# Patient Record
Sex: FEMALE | Race: WHITE | NOT HISPANIC OR LATINO | Employment: FULL TIME | URBAN - METROPOLITAN AREA
[De-identification: names, ages, dates, MRNs, and addresses within clinical notes are randomized per-mention and may not be internally consistent; named-entity substitution may affect disease eponyms.]

---

## 2019-06-18 ENCOUNTER — HOSPITAL ENCOUNTER (EMERGENCY)
Facility: HOSPITAL | Age: 39
Discharge: HOME | End: 2019-06-18
Attending: EMERGENCY MEDICINE
Payer: OTHER GOVERNMENT

## 2019-06-18 VITALS
DIASTOLIC BLOOD PRESSURE: 89 MMHG | BODY MASS INDEX: 34.91 KG/M2 | RESPIRATION RATE: 20 BRPM | HEIGHT: 63 IN | OXYGEN SATURATION: 99 % | HEART RATE: 100 BPM | SYSTOLIC BLOOD PRESSURE: 122 MMHG | WEIGHT: 197 LBS | TEMPERATURE: 98 F
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
## 2019-06-18 DIAGNOSIS — S16.1XXA STRAIN OF NECK MUSCLE, INITIAL ENCOUNTER: ICD-10-CM

## 2019-06-18 PROCEDURE — 99282 EMERGENCY DEPT VISIT SF MDM: CPT

## 2019-06-19 NOTE — DISCHARGE INSTRUCTIONS
Review attached instructions  Ibuprofen 200 mg tablet take 3 tablets 3 times daily with meals  Gentle range of motion  Expect stepwise improvement over the following days
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
## 2019-08-22 ENCOUNTER — OFFICE VISIT (OUTPATIENT)
Dept: GENETICS | Facility: CLINIC | Age: 39
End: 2019-08-22
Payer: OTHER GOVERNMENT

## 2019-08-22 ENCOUNTER — LAB VISIT (OUTPATIENT)
Dept: LAB | Facility: HOSPITAL | Age: 39
End: 2019-08-22
Attending: MEDICAL GENETICS
Payer: OTHER GOVERNMENT

## 2019-08-22 VITALS
HEIGHT: 64 IN | BODY MASS INDEX: 32.31 KG/M2 | SYSTOLIC BLOOD PRESSURE: 139 MMHG | HEART RATE: 101 BPM | WEIGHT: 189.25 LBS | DIASTOLIC BLOOD PRESSURE: 78 MMHG

## 2019-08-22 DIAGNOSIS — Z84.81 FAMILY HISTORY OF BRCA1 GENE POSITIVE: ICD-10-CM

## 2019-08-22 DIAGNOSIS — Z84.81 FAMILY HISTORY OF BRCA1 GENE POSITIVE: Primary | ICD-10-CM

## 2019-08-22 PROCEDURE — 99499 UNLISTED E&M SERVICE: CPT | Mod: S$PBB,,, | Performed by: MEDICAL GENETICS

## 2019-08-22 PROCEDURE — 99212 OFFICE O/P EST SF 10 MIN: CPT | Mod: PBBFAC | Performed by: GENETIC COUNSELOR, MS

## 2019-08-22 PROCEDURE — 96040 PR GENETIC COUNSELING, EACH 30 MIN: CPT | Mod: ,,, | Performed by: MEDICAL GENETICS

## 2019-08-22 PROCEDURE — 96040 PR GENETIC COUNSELING, EACH 30 MIN: ICD-10-PCS | Mod: ,,, | Performed by: MEDICAL GENETICS

## 2019-08-22 PROCEDURE — 99999 PR PBB SHADOW E&M-EST. PATIENT-LVL II: ICD-10-PCS | Mod: PBBFAC,,,

## 2019-08-22 PROCEDURE — 99999 PR PBB SHADOW E&M-EST. PATIENT-LVL II: CPT | Mod: PBBFAC,,,

## 2019-08-22 PROCEDURE — 36415 COLL VENOUS BLD VENIPUNCTURE: CPT

## 2019-08-22 PROCEDURE — 99499 NO LOS: ICD-10-PCS | Mod: S$PBB,,, | Performed by: MEDICAL GENETICS

## 2019-08-22 NOTE — PROGRESS NOTES
Aurelia Villeda   DOS: 2019  : 1980  MRN: [id number]    REFERRING MD: Self    REASON FOR CONSULT: Our Medical Genetic Service was asked to evaluate this 38-year-old woman with a family history of cancer, and a family history of BRCA1. She presents with her .    PRESENT ILLNESS:  Ms. Villeda is a  female with no personal history of cancer. She had a breast biopsy last week with results pending. She had menarche at age 10 and has taken hormonal contraceptives for 15 years. She has never had hormone replacement therapy (HRT). She has never had a colonoscopy or dermatology examination. Her uterus and ovaries remain intact.     PAST MEDICAL HISTORY  Family history BRCA1 Positive Mutation  Family history ovarian cancer  Family history of melanoma     FAMILY HISTORY: Ms. Villeda has a significant family history of cancer on both sides of her family. She reports her mother had ovarian cancer at age 21, had a full hysterectomy, and  at age 45. Her maternal aunt had ovarian cancer at age 20, had a full hysterectomy and  at 55. Her maternal uncle had a melanoma had age 40. Her MGM had lung cancer but was a smoker, and her MGF had skin cancer of unknown etiology. No individuals on her mothers side of the family have had genetic testing to her knowledge. Ms. Villeda has 4 paternal aunts with confirmed BRCA1 pathogenic variants (copy of [facility] report provided). Their cancer history is as follows: paternal aunt with breast cancer at age 26 and  at 28, paternal aunt with breast cancer at age 21, and ovarian cancer at age 51, paternal aunt with breast cancer at age 33 and  at 51, and paternal aunt with history of breast and colon cancers. Her PGF had breast cancer at age 56. Her father has not had cancer to her knowledge, but she is not in contact with him. Ms. Villeda has two daughters (ages 8 and 12) and two brothers without any cancer history. A three-generation pedigree was obtained and is  scanned into the media tab.     IMPRESSION: BRCA1 and BRCA2 Associated Hereditary Breast and Ovarian Cancer (HBOC) is an autosomal dominant hereditary cancer syndrome associated with a higher risk for breast cancer and ovarian cancer in women, and breast cancer and prostate cancer in men.     Breast cancer risks for individuals with a BRCA1 pathogenic variant is 46-87% and compared to 12% in the general population. Ovarian cancer risks are 39-63% for women with a BRCA1 mutation compared to 1-2% in the general population.    We discussed these risks and associated screening and surgical management options in detail. Women with a confirmed BRCA1 mutation should have a clinical breast exam every 6-12 months beginning at age 25. Annual breast MRI with contrast should begin at age 25-29. Between the ages of 35-75, it is recommended that women should alternate between breast MRI and mammogram every 6 months. We also discussed risks, benefits, and limitations of a risk-reducing mastectomy. Because there are no good surveillance options for ovarian cancer at this time, the recommendation is for women with a BRCA1 pathogenic variant to have their ovaries and fallopian tubes removed between the ages of 35 and 40 or upon completion of child bearing. We spent time discussing the psychosocial implications of risk management including feelings surrounding sexuality after a mastectomy and/or oophorectomy, recovery from surgery, and anxiety during annual or bi-annual screening and biopsies.     We also discussed that because BRCA1 is inherited from a parent, we cannot assume if her father is positive until he has had testing himself. A positive result in Ms. Villeda would confirm that her father is BRCA1+, but a negative result would be uninformative, and he should still consider pursuing his own genetic testing to inform his risk and his sons risk. Men with BRCA1 mutations are at an increased risk for prostate cancer (8.6%  compared to 6% in the general population) and male breast cancer (1.2% compared to 0.1%). Men with BRCA1 mutations should begin annual clinical breast examinations beginning at age 35 and consider prostate screening beginning at age 45. Ms. Villeda is not in contact with her father. We also discussed that Ms. Villedas brothers are at risk of inheriting the variant and should consider testing regardless of her results.     Given the strong family history of ovarian cancer and melanoma on her mothers side of the family, we discussed the benefits of a genetic testing panel. The 35 gene [employer] panel (BRCA1, BRCA2, MLH1, MSH2, MSH6, PMS2, EPCAM, APC, MUTYH, CDKN2A (p07EMR0s), CDKN2A(p14ARF), CDK4, TP53, PTEN, STK11, CDH1, BMPR1A, SMAD4, PALB2, CHEK2, VALERIA, NBN, BARD1, BRIP1, RAD51C, RAD51D, POLD, 1POLE, GREM1, HOXB13, AXIN2, GALNT12, RPS20, RNF43, NTHL1, MSH3) is a comprehensive panel with genes associated with multiple cancer types.     Ms. Villeda and her  expressed interest in having their daughters tested if Ms. Villeda is positive, as they will be at 50% risk. I stated that because these conditions affect adults, we like to wait until individuals can make an autonomous decision for themselves regarding testing.     We discussed the benefits, limitations, and possible results of a hereditary cancer panel. Because Ms. Villeda does not have a family history or cancer, we spent time discussing the Genetic Information Nondiscrimination Act (MIGUEL) that protects individuals from discrimination on the basis of genetic information from medical insurance providers and employers. The law does not cover life insurance or long-term disability insurance, however. Genetic testing in a person without a history of cancer can also provide undue worry and stress during testing, return of results, and subsequent screening if necessary. Ms. Villeda understood these implications.     She would like to receive results over  the phone with a follow-up appointment for additional counseling depending on results. I told her I would call her to find out the results of her biopsy in the upcoming week.     RECOMMENDATIONS:  1. [facility] Cancer Panel   2. Follow-up depending on results     TIME SPENT: 45 minutes, with over 50% spent counseling       Eric Mendoza M.D.                                                                                    Maggy Booker, MPH, MS                 Section Head - Medical Genetics                                                                                     Genetic Counselor  [facility]

## 2019-09-09 ENCOUNTER — PATIENT MESSAGE (OUTPATIENT)
Dept: GENETICS | Facility: CLINIC | Age: 39
End: 2019-09-09

## 2019-09-10 ENCOUNTER — TELEPHONE (OUTPATIENT)
Dept: GENETICS | Facility: CLINIC | Age: 39
End: 2019-09-10

## 2019-09-18 ENCOUNTER — PATIENT MESSAGE (OUTPATIENT)
Dept: GENETICS | Facility: CLINIC | Age: 39
End: 2019-09-18

## 2019-09-19 ENCOUNTER — TELEPHONE (OUTPATIENT)
Dept: SURGERY | Facility: CLINIC | Age: 39
End: 2019-09-19

## 2019-09-19 NOTE — TELEPHONE ENCOUNTER
----- Message from Koko Wilcox sent at 9/19/2019  3:27 PM CDT -----  Pt calling to schedule a appt for a breast lump.      [phone]

## 2019-09-19 NOTE — TELEPHONE ENCOUNTER
Returned the patient call regarding the message below.  The patient stated that she would like to be seen to have a breast lump removed that was biopsied at [facility] in [place].  Stated to the patient that we would need to get her referral and records sent to our office.  The patient stated the she would email the referral to myself and asked that I email her a release of information to request her records.  Release sent to email address on file.

## 2019-09-24 ENCOUNTER — TELEPHONE (OUTPATIENT)
Dept: SURGERY | Facility: CLINIC | Age: 39
End: 2019-09-24

## 2019-09-24 NOTE — TELEPHONE ENCOUNTER
Contacted the patient regarding scheduling appointment with breast surgeon 2nd opinion to discuss removal of breast lump.  The patient is scheduled to be seen on Thursday 10/17/19 at 2 pm with .  The patient voiced understanding of appointment date, time, and location.  Reminder letter mailed to the patient.

## 2019-10-17 ENCOUNTER — OFFICE VISIT (OUTPATIENT)
Dept: SURGERY | Facility: CLINIC | Age: 39
End: 2019-10-17
Payer: OTHER GOVERNMENT

## 2019-10-17 ENCOUNTER — HOSPITAL ENCOUNTER (OUTPATIENT)
Dept: RADIOLOGY | Facility: HOSPITAL | Age: 39
Discharge: HOME | End: 2019-10-17
Attending: SURGERY
Payer: OTHER GOVERNMENT

## 2019-10-17 VITALS
BODY MASS INDEX: 33.32 KG/M2 | DIASTOLIC BLOOD PRESSURE: 91 MMHG | TEMPERATURE: 99 F | SYSTOLIC BLOOD PRESSURE: 149 MMHG | HEART RATE: 74 BPM | WEIGHT: 195.19 LBS | HEIGHT: 64 IN

## 2019-10-17 DIAGNOSIS — Z91.89 AT HIGH RISK FOR BREAST CANCER: ICD-10-CM

## 2019-10-17 DIAGNOSIS — Z12.31 ENCOUNTER FOR SCREENING MAMMOGRAM FOR BREAST CANCER: ICD-10-CM

## 2019-10-17 DIAGNOSIS — Z12.31 ENCOUNTER FOR SCREENING MAMMOGRAM FOR BREAST CANCER: Primary | ICD-10-CM

## 2019-10-17 PROCEDURE — 77067 SCR MAMMO BI INCL CAD: CPT | Mod: 26,,, | Performed by: RADIOLOGY

## 2019-10-17 PROCEDURE — 99999 PR PBB SHADOW E&M-EST. PATIENT-LVL III: ICD-10-PCS | Mod: PBBFAC,,, | Performed by: SURGERY

## 2019-10-17 PROCEDURE — 99204 PR OFFICE/OUTPT VISIT, NEW, LEVL IV, 45-59 MIN: ICD-10-PCS | Mod: S$PBB,,, | Performed by: SURGERY

## 2019-10-17 PROCEDURE — 99999 PR PBB SHADOW E&M-EST. PATIENT-LVL III: CPT | Mod: PBBFAC,,, | Performed by: SURGERY

## 2019-10-17 PROCEDURE — 77063 MAMMO DIGITAL SCREENING BILAT WITH TOMOSYNTHESIS_CAD: ICD-10-PCS | Mod: 26,,, | Performed by: RADIOLOGY

## 2019-10-17 PROCEDURE — 77067 MAMMO DIGITAL SCREENING BILAT WITH TOMOSYNTHESIS_CAD: ICD-10-PCS | Mod: 26,,, | Performed by: RADIOLOGY

## 2019-10-17 PROCEDURE — 77067 SCR MAMMO BI INCL CAD: CPT | Mod: TC,PO

## 2019-10-17 PROCEDURE — 99213 OFFICE O/P EST LOW 20 MIN: CPT | Mod: PBBFAC | Performed by: SURGERY

## 2019-10-17 PROCEDURE — 77063 BREAST TOMOSYNTHESIS BI: CPT | Mod: 26,,, | Performed by: RADIOLOGY

## 2019-10-17 PROCEDURE — 99204 OFFICE O/P NEW MOD 45 MIN: CPT | Mod: S$PBB,,, | Performed by: SURGERY

## 2019-10-17 RX ORDER — ERGOCALCIFEROL 1.25 MG/1
CAPSULE ORAL
COMMUNITY
Start: 2019-07-30 | End: 2020-11-23

## 2019-10-17 NOTE — PROGRESS NOTES
"BREAST HIGH RISK CLINIC  [facility]  Breast Surgery      Date of Visit:  10/17/2019    Referring provider:  Susana Self  No address on file    PCP:  Primary Doctor No    HIGH RISK    Aurelia Villeda is a 38 y.o. premenopausal female referred for evaluation of increased risk of breast cancer based on TC score 39%.  Here today to discussed options of high risk screening and risk reduction. Family history BRCA1. Prior biopsy that was negative. Patient genetic testing was negative.    Age of menarche was 10.  Patient denies hormonal therapy, reports OCP in the past.     Patient is .  Age of first live birth was 26.  Patient did breast feed x 2 months.     Family history of BRCA1, ovarian ca, melanoma.  Mother ovarian at 21, maternal aunt ovarian 20, maternal uncle melanoma  MGM lung ca, MGF skin cancer.  4 paternal aunts BRCA1. (paternal aunt br 26, paternal aunt breast at 21, ov ca 51. Paternal aunt  Breast 33,  51. Paternal aunt with breast and colon). Father unknown.    The following portions of the patient's history were reviewed and updated as appropriate: She  has no past medical history on file.  She does not have a problem list on file.  She  has a past surgical history that includes Breast biopsy (Right, 2019) and Breast cyst aspiration.  Her family history includes Breast cancer in her paternal aunt, paternal aunt, paternal aunt, paternal aunt, paternal aunt, and paternal grandmother.  She  reports that she has been smoking. She does not have any smokeless tobacco history on file. She reports that she does not drink alcohol or use drugs.  She has a current medication list which includes the following prescription(s): ergocalciferol.  She has No Known Allergies..    Review of Systems  Pertinent items are noted in HPI.      Objective:   BP (!) 149/91 (BP Location: Left arm, Patient Position: Sitting)   Pulse 74   Temp 98.6 °F (37 °C)   Ht 5' 4" (1.626 m)   Wt 88.6 kg (195 lb 3.5 " oz)   BMI 33.51 kg/m²     General: NAD  Chest: nonlabored breathing, no obvious deformity  Heart: regular rate  Abdomen: soft, nondistended  Extremities: no edema noted  Breasts: breasts appear normal, no suspicious masses, no skin or nipple changes or axillary nodes.    Imaging  Mammograms:  Today BIRADS 2     Assessment:     This is a 38 y.o. female with an increased risk of breast cancer based on fmaily histoyr.        Plan:   Discussed risk models can vary based on the model used.  There are several models available and we currently use TC model for our patients with family history.  Based on this, the patient's risk exceeds 20%.  Patients with lifetime risk over 20% qualify for increased screening with MRI, in addition to mammograms.  We also would perform breast exams every 6 months.  Discussed pros and cons of MRI screening.    We also discussed risk reduction options. Discussed that we recommend a healthy lifestyle.      Nutrition we recommend fresh fruits and vegetables and lean meats and avoidance of processed foods.    Exercise I recommend at least 30 minutes of cardiovascular exercise 4-5 times per week, even walking would have benefit.  Discussed that exercise can lower the relative risk of breast cancer by about 18-20%.  Also discussed that obesity is linked to higher risk of breast cancer, therefore exercise in important.    Discussed alcohol use and some studies suggest that increase alcohol intake may increase breast cancer risk.  Therefore, I do recommend limited alcohol intake.    Also discussed risk factors that are not modifiable, such as age at menarche, age at menopause, age at first pregnancy, and family history.     We did discuss hormone replacement therapy as well.  In patients at increased risk, I usually do not recommend HRT for long periods of time.      Discussed briefly risk reduction options with chemoprevention, such as Tamoxifen or Raloxifene.  These have been shown to lower the  risk of breast cancer incidence, however have not been shown to improve survival in patients who do not have a breast cancer.    I will see the patient back in 6 months with a MRI.  Will get path from Carmela.  And outside interp of images.

## 2019-10-23 ENCOUNTER — PATIENT MESSAGE (OUTPATIENT)
Dept: SURGERY | Facility: CLINIC | Age: 39
End: 2019-10-23

## 2019-11-21 DIAGNOSIS — Z80.3 FAMILY HISTORY OF MALIGNANT NEOPLASM OF BREAST: Primary | ICD-10-CM

## 2020-01-13 ENCOUNTER — PATIENT MESSAGE (OUTPATIENT)
Dept: SURGERY | Facility: CLINIC | Age: 40
End: 2020-01-13

## 2020-01-24 DIAGNOSIS — Z91.89 AT HIGH RISK FOR BREAST CANCER: Primary | ICD-10-CM

## 2020-01-24 PROCEDURE — 88321 CONSLTJ&REPRT SLD PREP ELSWR: CPT | Performed by: PATHOLOGY

## 2020-02-07 LAB
GENETIC COUNSELING?: YES
GENSO SPECIMEN TYPE: NORMAL
MISCELLANEOUS GENETIC TEST NAME: NORMAL
PARTENTAL OR SIBLING TESTING?: NO
REFERENCE LAB: NORMAL
TEST RESULT: NORMAL

## 2020-02-27 ENCOUNTER — PATIENT MESSAGE (OUTPATIENT)
Dept: SURGERY | Facility: CLINIC | Age: 40
End: 2020-02-27

## 2020-02-28 DIAGNOSIS — Z80.3 FAMILY HISTORY OF BREAST CANCER: Primary | ICD-10-CM

## 2020-02-28 DIAGNOSIS — Z91.89 AT HIGH RISK FOR BREAST CANCER: ICD-10-CM

## 2020-04-06 ENCOUNTER — TELEPHONE (OUTPATIENT)
Dept: SURGERY | Facility: CLINIC | Age: 40
End: 2020-04-06

## 2020-04-21 ENCOUNTER — PATIENT MESSAGE (OUTPATIENT)
Dept: SURGERY | Facility: CLINIC | Age: 40
End: 2020-04-21

## 2020-04-28 ENCOUNTER — PATIENT MESSAGE (OUTPATIENT)
Dept: SURGERY | Facility: CLINIC | Age: 40
End: 2020-04-28

## 2020-05-27 NOTE — PROGRESS NOTES
BREAST HIGH RISK CLINIC  [facility]  Breast Surgery      Date of Visit:  2020    Referring provider:  No referring provider defined for this encounter.    PCP:  Primary Doctor No    HIGH RISK    Aurelia Villeda is a 39 y.o. premenopausal female referred for evaluation of increased risk of breast cancer based on TC score 39%.  Here today to discussed options of high risk screening and risk reduction. Family history BRCA1. Prior biopsy that was negative. Patient genetic testing was negative.    Age of menarche was 10.  Patient denies hormonal therapy, reports OCP in the past.     Patient is .  Age of first live birth was 26.  Patient did breast feed x 2 months.     Family history of BRCA1, ovarian ca, melanoma.  Mother ovarian at 21, maternal aunt ovarian 20, maternal uncle melanoma  MGM lung ca, MGF skin cancer.  4 paternal aunts BRCA1. (paternal aunt br 26, paternal aunt breast at 21, ov ca 51. Paternal aunt  Breast 33,  51. Paternal aunt with breast and colon). Father unknown.    Interval History 20:  She presents for 6 month f/u, with MRI scheduled today.  Doing well.    The following portions of the patient's history were reviewed and updated as appropriate: She  has no past medical history on file.  She does not have a problem list on file.  She  has a past surgical history that includes Breast biopsy (Right, 2019) and Breast cyst aspiration.  Her family history includes Breast cancer in her paternal aunt, paternal aunt, paternal aunt, paternal aunt, paternal aunt, and paternal grandmother.  She  reports that she has been smoking. She does not have any smokeless tobacco history on file. She reports that she does not drink alcohol or use drugs.  She has a current medication list which includes the following prescription(s): ergocalciferol.  She has No Known Allergies..    Review of Systems  Pertinent items are noted in HPI.      Objective:   There were no vitals taken for this  visit.    General: NAD  Chest: nonlabored breathing, no obvious deformity  Heart: regular rate  Abdomen: soft, nondistended  Extremities: no edema noted  Breasts: breasts appear normal, no suspicious masses, no skin or nipple changes or axillary nodes.    Imaging  MRi today neg     Assessment:     This is a 39 y.o. female with an increased risk of breast cancer based on family history.         Plan:     MRI today negative  F/u 6 months with MMG.

## 2020-05-28 ENCOUNTER — HOSPITAL ENCOUNTER (OUTPATIENT)
Dept: RADIOLOGY | Facility: HOSPITAL | Age: 40
Discharge: HOME | End: 2020-05-28
Attending: SURGERY
Payer: OTHER GOVERNMENT

## 2020-05-28 ENCOUNTER — OFFICE VISIT (OUTPATIENT)
Dept: SURGERY | Facility: CLINIC | Age: 40
End: 2020-05-28
Payer: OTHER GOVERNMENT

## 2020-05-28 VITALS
HEIGHT: 64 IN | DIASTOLIC BLOOD PRESSURE: 82 MMHG | SYSTOLIC BLOOD PRESSURE: 117 MMHG | HEART RATE: 91 BPM | BODY MASS INDEX: 33.29 KG/M2 | WEIGHT: 195 LBS

## 2020-05-28 DIAGNOSIS — Z80.3 FAMILY HISTORY OF BREAST CANCER: Primary | ICD-10-CM

## 2020-05-28 DIAGNOSIS — Z91.89 AT HIGH RISK FOR BREAST CANCER: ICD-10-CM

## 2020-05-28 DIAGNOSIS — Z80.3 FAMILY HISTORY OF BREAST CANCER: ICD-10-CM

## 2020-05-28 DIAGNOSIS — Z12.31 ENCOUNTER FOR SCREENING MAMMOGRAM FOR BREAST CANCER: ICD-10-CM

## 2020-05-28 PROCEDURE — 77049 MRI BREAST C-+ W/CAD BI: CPT | Mod: 26,,, | Performed by: RADIOLOGY

## 2020-05-28 PROCEDURE — 25500020 PHARM REV CODE 255: Performed by: SURGERY

## 2020-05-28 PROCEDURE — 99213 OFFICE O/P EST LOW 20 MIN: CPT | Mod: PBBFAC | Performed by: SURGERY

## 2020-05-28 PROCEDURE — 77049 MRI BREAST C-+ W/CAD BI: CPT | Mod: TC

## 2020-05-28 PROCEDURE — 99999 PR PBB SHADOW E&M-EST. PATIENT-LVL III: CPT | Mod: PBBFAC,,, | Performed by: SURGERY

## 2020-05-28 PROCEDURE — 99213 PR OFFICE/OUTPT VISIT, EST, LEVL III, 20-29 MIN: ICD-10-PCS | Mod: S$PBB,,, | Performed by: SURGERY

## 2020-05-28 PROCEDURE — 77049 MRI BREAST W/WO CONTRAST, W/CAD, BILATERAL: ICD-10-PCS | Mod: 26,,, | Performed by: RADIOLOGY

## 2020-05-28 PROCEDURE — 99213 OFFICE O/P EST LOW 20 MIN: CPT | Mod: S$PBB,,, | Performed by: SURGERY

## 2020-05-28 PROCEDURE — A9577 INJ MULTIHANCE: HCPCS | Performed by: SURGERY

## 2020-05-28 PROCEDURE — 99999 PR PBB SHADOW E&M-EST. PATIENT-LVL III: ICD-10-PCS | Mod: PBBFAC,,, | Performed by: SURGERY

## 2020-05-28 RX ADMIN — GADOBENATE DIMEGLUMINE 19 ML: 529 INJECTION, SOLUTION INTRAVENOUS at 01:05

## 2020-11-22 NOTE — PROGRESS NOTES
BREAST HIGH RISK CLINIC  [facility]  Breast Surgery      Date of Visit:  2020    Referring provider:  No referring provider defined for this encounter.    PCP:  Primary Doctor No    HIGH RISK    Aurelia Villeda is a 40 y.o. premenopausal female referred for evaluation of increased risk of breast cancer based on TC score 39%.  Here today to discussed options of high risk screening and risk reduction. Family history BRCA1. Prior biopsy that was negative. Patient genetic testing was negative.    Age of menarche was 10.  Patient denies hormonal therapy, reports OCP in the past.     Patient is .  Age of first live birth was 26.  Patient did breast feed x 2 months.     Family history of BRCA1, ovarian ca, melanoma.  Mother ovarian at 21, maternal aunt ovarian 20, maternal uncle melanoma  MGM lung ca, MGF skin cancer.  4 paternal aunts BRCA1. (paternal aunt br 26, paternal aunt breast at 21, ov ca 51. Paternal aunt  Breast 33,  51. Paternal aunt with breast and colon). Father unknown.    Interval History 20:  She presents for 6 month f/u, with MRI scheduled today.  Doing well.    Interval History 20:   She presents for a 6 month f/u today. Mammogram scheduled today. Doing well, denies headache, bone pain, any new palpable masses, skin changes, dimpling.     The following portions of the patient's history were reviewed and updated as appropriate: She  has no past medical history on file.  She does not have a problem list on file.  She  has a past surgical history that includes Breast biopsy (Right, 2019) and Breast cyst aspiration.  Her family history includes Breast cancer in her paternal aunt, paternal aunt, paternal aunt, paternal aunt, paternal aunt, and paternal grandmother.  She  reports that she has been smoking. She does not have any smokeless tobacco history on file. She reports that she does not drink alcohol or use drugs.  She has a current medication list which includes  "the following prescription(s): ergocalciferol.  She has No Known Allergies..    Review of Systems  Pertinent items are noted in HPI.      Objective:   /69 (BP Location: Left arm, Patient Position: Sitting, BP Method: Medium (Automatic))   Pulse 82   Ht 5' 4" (1.626 m)   Wt 88.5 kg (195 lb 1.7 oz)   LMP 11/09/2020 (Approximate)   BMI 33.49 kg/m²       General: NAD  Chest: nonlabored breathing, no obvious deformity  Heart: regular rate  Abdomen: soft, nondistended  Extremities: no edema noted  Breasts: breasts appear normal, no suspicious masses, no skin or nipple changes or axillary nodes.    Imaging  Result:  Mammo Digital Diagnostic Left with Jose A     History:  Patient is 40 y.o. and is seen for inconclusive screening mammogram.      Films Compared:  Compared to: 11/23/2020 Mammo Digital Screening Bilat w/ Jose A and 10/17/2019 Mammo Digital Screening Bilat w/ Jose A     Findings:  This procedure was performed using tomosynthesis. Computer-aided detection was utilized in the interpretation of this examination.     The left breast is heterogeneously dense, which may obscure small masses. There is no evidence of suspicious masses, microcalcifications or architectural distortion.  Asymmetry in the lateral left breast effaced with compression.      Impression:  No mammographic evidence of malignancy.     BI-RADS Category 1: Negative     Recommendation:  Routine screening mammogram in 1 year is recommended.     Your estimated lifetime risk of breast cancer (to age 85) based on Tyrer-Cuzick risk assessment model is Tyrer-Cuzick: 36.72 %. According to the American Cancer Society, patients with a lifetime breast cancer risk of 20% or higher might benefit from supplemental screening tests.     Assessment:     This is a 40 y.o. female with an increased risk of breast cancer based on family history.         Plan:     MMG today negative   F/u 6 months with MRI    "

## 2020-11-23 ENCOUNTER — OFFICE VISIT (OUTPATIENT)
Dept: SURGERY | Facility: CLINIC | Age: 40
End: 2020-11-23
Payer: OTHER GOVERNMENT

## 2020-11-23 ENCOUNTER — HOSPITAL ENCOUNTER (OUTPATIENT)
Dept: RADIOLOGY | Facility: HOSPITAL | Age: 40
Discharge: HOME | End: 2020-11-23
Attending: SURGERY
Payer: OTHER GOVERNMENT

## 2020-11-23 VITALS
HEART RATE: 82 BPM | WEIGHT: 195.13 LBS | BODY MASS INDEX: 33.31 KG/M2 | DIASTOLIC BLOOD PRESSURE: 69 MMHG | HEIGHT: 64 IN | SYSTOLIC BLOOD PRESSURE: 116 MMHG

## 2020-11-23 DIAGNOSIS — R92.8 ABNORMAL MAMMOGRAM: ICD-10-CM

## 2020-11-23 DIAGNOSIS — Z91.89 AT HIGH RISK FOR BREAST CANCER: Primary | ICD-10-CM

## 2020-11-23 DIAGNOSIS — Z12.31 SCREENING MAMMOGRAM, ENCOUNTER FOR: ICD-10-CM

## 2020-11-23 DIAGNOSIS — Z80.3 FAMILY HISTORY OF BREAST CANCER: ICD-10-CM

## 2020-11-23 DIAGNOSIS — Z12.31 ENCOUNTER FOR SCREENING MAMMOGRAM FOR BREAST CANCER: ICD-10-CM

## 2020-11-23 PROCEDURE — 77061 MAMMO DIGITAL DIAGNOSTIC LEFT WITH TOMO: ICD-10-PCS | Mod: 26,GG,LT, | Performed by: RADIOLOGY

## 2020-11-23 PROCEDURE — 77061 BREAST TOMOSYNTHESIS UNI: CPT | Mod: TC,LT

## 2020-11-23 PROCEDURE — 99213 OFFICE O/P EST LOW 20 MIN: CPT | Mod: S$PBB,,, | Performed by: SURGERY

## 2020-11-23 PROCEDURE — 77065 DX MAMMO INCL CAD UNI: CPT | Mod: TC,LT

## 2020-11-23 PROCEDURE — 99999 PR PBB SHADOW E&M-EST. PATIENT-LVL III: CPT | Mod: PBBFAC,,, | Performed by: SURGERY

## 2020-11-23 PROCEDURE — 77061 BREAST TOMOSYNTHESIS UNI: CPT | Mod: 26,GG,LT, | Performed by: RADIOLOGY

## 2020-11-23 PROCEDURE — 99213 PR OFFICE/OUTPT VISIT, EST, LEVL III, 20-29 MIN: ICD-10-PCS | Mod: S$PBB,,, | Performed by: SURGERY

## 2020-11-23 PROCEDURE — 99999 PR PBB SHADOW E&M-EST. PATIENT-LVL III: ICD-10-PCS | Mod: PBBFAC,,, | Performed by: SURGERY

## 2020-11-23 PROCEDURE — 77067 SCR MAMMO BI INCL CAD: CPT | Mod: 26,59,, | Performed by: RADIOLOGY

## 2020-11-23 PROCEDURE — 99213 OFFICE O/P EST LOW 20 MIN: CPT | Mod: PBBFAC | Performed by: SURGERY

## 2020-11-23 PROCEDURE — 77065 MAMMO DIGITAL DIAGNOSTIC LEFT WITH TOMO: ICD-10-PCS | Mod: 26,GG,LT, | Performed by: RADIOLOGY

## 2020-11-23 PROCEDURE — 77063 MAMMO DIGITAL SCREENING BILAT WITH TOMOSYNTHESIS_CAD: ICD-10-PCS | Mod: 26,59,, | Performed by: RADIOLOGY

## 2020-11-23 PROCEDURE — 77065 DX MAMMO INCL CAD UNI: CPT | Mod: 26,GG,LT, | Performed by: RADIOLOGY

## 2020-11-23 PROCEDURE — 77067 SCR MAMMO BI INCL CAD: CPT | Mod: TC,59

## 2020-11-23 PROCEDURE — 77063 BREAST TOMOSYNTHESIS BI: CPT | Mod: 26,59,, | Performed by: RADIOLOGY

## 2020-11-23 PROCEDURE — 77067 MAMMO DIGITAL SCREENING BILAT WITH TOMOSYNTHESIS_CAD: ICD-10-PCS | Mod: 26,59,, | Performed by: RADIOLOGY

## 2021-01-22 ENCOUNTER — TELEPHONE (OUTPATIENT)
Dept: SURGERY | Facility: CLINIC | Age: 41
End: 2021-01-22

## 2021-05-24 ENCOUNTER — HOSPITAL ENCOUNTER (OUTPATIENT)
Dept: RADIOLOGY | Facility: HOSPITAL | Age: 41
Discharge: HOME | End: 2021-05-24
Attending: SURGERY
Payer: OTHER GOVERNMENT

## 2021-05-24 DIAGNOSIS — Z91.89 AT HIGH RISK FOR BREAST CANCER: ICD-10-CM

## 2021-05-24 PROCEDURE — 25500020 PHARM REV CODE 255: Performed by: SURGERY

## 2021-05-24 PROCEDURE — 77049 MRI BREAST W/WO CONTRAST, W/CAD, BILATERAL: ICD-10-PCS | Mod: 26,,, | Performed by: RADIOLOGY

## 2021-05-24 PROCEDURE — A9577 INJ MULTIHANCE: HCPCS | Performed by: SURGERY

## 2021-05-24 PROCEDURE — 77049 MRI BREAST C-+ W/CAD BI: CPT | Mod: 26,,, | Performed by: RADIOLOGY

## 2021-05-24 PROCEDURE — 77049 MRI BREAST C-+ W/CAD BI: CPT | Mod: TC

## 2021-05-24 RX ADMIN — GADOBENATE DIMEGLUMINE 19 ML: 529 INJECTION, SOLUTION INTRAVENOUS at 01:05

## 2022-01-01 NOTE — TELEPHONE ENCOUNTER
Spoke to patient about rescheduling her appt for 4/27/20 with Dr. Lovell to a video visit on 4/30/20 at 1030 due to Covid 19.   KHANH

## 2023-03-21 ENCOUNTER — OFFICE VISIT (OUTPATIENT)
Dept: URGENT CARE | Facility: CLINIC | Age: 43
End: 2023-03-21
Payer: OTHER GOVERNMENT

## 2023-03-21 VITALS
TEMPERATURE: 98 F | DIASTOLIC BLOOD PRESSURE: 84 MMHG | WEIGHT: 190 LBS | BODY MASS INDEX: 33.66 KG/M2 | HEIGHT: 63 IN | HEART RATE: 110 BPM | SYSTOLIC BLOOD PRESSURE: 128 MMHG | OXYGEN SATURATION: 99 %

## 2023-03-21 DIAGNOSIS — B96.89 BACTERIAL URI: Primary | ICD-10-CM

## 2023-03-21 DIAGNOSIS — J06.9 BACTERIAL URI: Primary | ICD-10-CM

## 2023-03-21 PROCEDURE — 99212 OFFICE O/P EST SF 10 MIN: CPT | Mod: ,,, | Performed by: NURSE PRACTITIONER

## 2023-03-21 PROCEDURE — 99212 PR OFFICE/OUTPT VISIT, EST, LEVL II, 10-19 MIN: ICD-10-PCS | Mod: ,,, | Performed by: NURSE PRACTITIONER

## 2023-03-21 RX ORDER — AMOXICILLIN 500 MG/1
500 CAPSULE ORAL EVERY 8 HOURS
Qty: 30 CAPSULE | Refills: 0 | Status: SHIPPED | OUTPATIENT
Start: 2023-03-21 | End: 2023-03-31

## 2023-03-21 RX ORDER — PROMETHAZINE HYDROCHLORIDE AND DEXTROMETHORPHAN HYDROBROMIDE 6.25; 15 MG/5ML; MG/5ML
5 SYRUP ORAL EVERY 4 HOURS PRN
Qty: 118 ML | Refills: 0 | Status: SHIPPED | OUTPATIENT
Start: 2023-03-21 | End: 2023-03-31

## 2023-03-21 NOTE — PROGRESS NOTES
"Subjective:       Patient ID: Aurelia Villeda is a 42 y.o. female.    Vitals:  height is 5' 3" (1.6 m) and weight is 86.2 kg (190 lb). Her oral temperature is 98.2 °F (36.8 °C). Her blood pressure is 128/84 and her pulse is 110. Her oxygen saturation is 99%.     Chief Complaint: Sore Throat (Sore throat and bilateral ear pain x 5 days. )    This is a 42 y.o. female who presents today with a chief complaint of sore throat and bilateral ear pain for 5 days.  No fever.  No cough.  Had a family member that was sick last week with sore throat but no dx.   Patient presents with:  Sore Throat: Sore throat and bilateral ear pain x 5 days.         Sore Throat   This is a new problem. The current episode started in the past 7 days. The problem has been unchanged. The pain is worse on the right side. There has been no fever. The pain is at a severity of 3/10. The pain is mild. Associated symptoms include coughing, ear pain, headaches, a plugged ear sensation, neck pain and shortness of breath. She has tried gargles, acetaminophen and NSAIDs for the symptoms. The treatment provided no relief.     HENT:  Positive for ear pain and sore throat.    Neck: Positive for neck pain.   Respiratory:  Positive for cough and shortness of breath.    Neurological:  Positive for headaches.         Objective:      Physical Exam   Constitutional: She is oriented to person, place, and time. obesity  HENT:   Head: Normocephalic and atraumatic.   Ears:   Right Ear: Tympanic membrane, external ear and ear canal normal.   Left Ear: Tympanic membrane, external ear and ear canal normal.   Nose: Nose normal.   Mouth/Throat: Mucous membranes are moist. Oropharynx is clear.   Eyes: Conjunctivae are normal. Pupils are equal, round, and reactive to light. Extraocular movement intact   Neck: Neck supple.   Cardiovascular: Normal rate, regular rhythm, normal heart sounds and normal pulses.   Pulmonary/Chest: Effort normal and breath sounds normal.   Abdominal: " Normal appearance.   Musculoskeletal: Normal range of motion.         General: Normal range of motion.   Neurological: She is alert and oriented to person, place, and time.   Skin: Skin is warm and dry.   Psychiatric: Her behavior is normal. Mood normal.   Vitals reviewed.      Past medical history and current medications reviewed.       Assessment:           1. Bacterial URI              Plan:         Bacterial URI  -     amoxicillin (AMOXIL) 500 MG capsule; Take 1 capsule (500 mg total) by mouth every 8 (eight) hours. for 10 days  Dispense: 30 capsule; Refill: 0  -     promethazine-dextromethorphan (PROMETHAZINE-DM) 6.25-15 mg/5 mL Syrp; Take 5 mLs by mouth every 4 (four) hours as needed (cough).  Dispense: 118 mL; Refill: 0             There are no Patient Instructions on file for this visit.           Medical Decision Making:   Differential Diagnosis:   URI  Covid  Influenza  Sinusitis

## 2023-08-27 ENCOUNTER — HOSPITAL ENCOUNTER (EMERGENCY)
Facility: HOSPITAL | Age: 43
Discharge: HOME | End: 2023-08-27
Attending: EMERGENCY MEDICINE
Payer: OTHER GOVERNMENT

## 2023-08-27 VITALS
WEIGHT: 195 LBS | DIASTOLIC BLOOD PRESSURE: 77 MMHG | HEIGHT: 63 IN | RESPIRATION RATE: 19 BRPM | BODY MASS INDEX: 34.55 KG/M2 | SYSTOLIC BLOOD PRESSURE: 118 MMHG | HEART RATE: 94 BPM | OXYGEN SATURATION: 100 % | TEMPERATURE: 99 F

## 2023-08-27 DIAGNOSIS — R07.9 CHEST PAIN: ICD-10-CM

## 2023-08-27 DIAGNOSIS — M25.471 ANKLE EDEMA, BILATERAL: ICD-10-CM

## 2023-08-27 DIAGNOSIS — M25.472 ANKLE EDEMA, BILATERAL: ICD-10-CM

## 2023-08-27 DIAGNOSIS — R07.89 ATYPICAL CHEST PAIN: Primary | ICD-10-CM

## 2023-08-27 LAB
ALBUMIN SERPL BCP-MCNC: 3.9 G/DL (ref 3.5–5.2)
ALP SERPL-CCNC: 81 U/L (ref 55–135)
ALT SERPL W/O P-5'-P-CCNC: 11 U/L (ref 10–44)
ANION GAP SERPL CALC-SCNC: 9 MMOL/L (ref 8–16)
AST SERPL-CCNC: 11 U/L (ref 10–40)
B-HCG UR QL: NEGATIVE
BASOPHILS # BLD AUTO: 0.05 K/UL (ref 0–0.2)
BASOPHILS NFR BLD: 0.5 % (ref 0–1.9)
BILIRUB SERPL-MCNC: 0.3 MG/DL (ref 0.1–1)
BUN SERPL-MCNC: 10 MG/DL (ref 6–20)
CALCIUM SERPL-MCNC: 8.9 MG/DL (ref 8.7–10.5)
CHLORIDE SERPL-SCNC: 109 MMOL/L (ref 95–110)
CO2 SERPL-SCNC: 22 MMOL/L (ref 23–29)
CREAT SERPL-MCNC: 0.8 MG/DL (ref 0.5–1.4)
D DIMER PPP IA.FEU-MCNC: 0.45 MG/L FEU
DIFFERENTIAL METHOD: ABNORMAL
EOSINOPHIL # BLD AUTO: 0.1 K/UL (ref 0–0.5)
EOSINOPHIL NFR BLD: 0.9 % (ref 0–8)
ERYTHROCYTE [DISTWIDTH] IN BLOOD BY AUTOMATED COUNT: 18.5 % (ref 11.5–14.5)
EST. GFR  (NO RACE VARIABLE): >60 ML/MIN/1.73 M^2
GLUCOSE SERPL-MCNC: 99 MG/DL (ref 70–110)
HCT VFR BLD AUTO: 32.6 % (ref 37–48.5)
HGB BLD-MCNC: 9.7 G/DL (ref 12–16)
IMM GRANULOCYTES # BLD AUTO: 0.05 K/UL (ref 0–0.04)
IMM GRANULOCYTES NFR BLD AUTO: 0.5 % (ref 0–0.5)
LIPASE SERPL-CCNC: 9 U/L (ref 4–60)
LYMPHOCYTES # BLD AUTO: 2.7 K/UL (ref 1–4.8)
LYMPHOCYTES NFR BLD: 24.5 % (ref 18–48)
MCH RBC QN AUTO: 21 PG (ref 27–31)
MCHC RBC AUTO-ENTMCNC: 29.8 G/DL (ref 32–36)
MCV RBC AUTO: 71 FL (ref 82–98)
MONOCYTES # BLD AUTO: 0.8 K/UL (ref 0.3–1)
MONOCYTES NFR BLD: 6.9 % (ref 4–15)
NEUTROPHILS # BLD AUTO: 7.4 K/UL (ref 1.8–7.7)
NEUTROPHILS NFR BLD: 66.7 % (ref 38–73)
NRBC BLD-RTO: 0 /100 WBC
PLATELET # BLD AUTO: 382 K/UL (ref 150–450)
PMV BLD AUTO: 9 FL (ref 9.2–12.9)
POTASSIUM SERPL-SCNC: 3.8 MMOL/L (ref 3.5–5.1)
PROT SERPL-MCNC: 6.7 G/DL (ref 6–8.4)
RBC # BLD AUTO: 4.61 M/UL (ref 4–5.4)
SODIUM SERPL-SCNC: 140 MMOL/L (ref 136–145)
TROPONIN I SERPL DL<=0.01 NG/ML-MCNC: <0.006 NG/ML (ref 0–0.03)
WBC # BLD AUTO: 11.11 K/UL (ref 3.9–12.7)

## 2023-08-27 PROCEDURE — 99284 EMERGENCY DEPT VISIT MOD MDM: CPT | Mod: 25

## 2023-08-27 PROCEDURE — 85025 COMPLETE CBC W/AUTO DIFF WBC: CPT | Performed by: EMERGENCY MEDICINE

## 2023-08-27 PROCEDURE — 96374 THER/PROPH/DIAG INJ IV PUSH: CPT

## 2023-08-27 PROCEDURE — 80053 COMPREHEN METABOLIC PANEL: CPT | Performed by: EMERGENCY MEDICINE

## 2023-08-27 PROCEDURE — 71046 X-RAY EXAM CHEST 2 VIEWS: CPT | Mod: TC

## 2023-08-27 PROCEDURE — 85379 FIBRIN DEGRADATION QUANT: CPT | Performed by: EMERGENCY MEDICINE

## 2023-08-27 PROCEDURE — 63600175 PHARM REV CODE 636 W HCPCS: Performed by: EMERGENCY MEDICINE

## 2023-08-27 PROCEDURE — 71046 X-RAY EXAM CHEST 2 VIEWS: CPT | Mod: 26,,, | Performed by: RADIOLOGY

## 2023-08-27 PROCEDURE — 84484 ASSAY OF TROPONIN QUANT: CPT | Performed by: EMERGENCY MEDICINE

## 2023-08-27 PROCEDURE — 81025 URINE PREGNANCY TEST: CPT | Performed by: EMERGENCY MEDICINE

## 2023-08-27 PROCEDURE — 71046 XR CHEST PA AND LATERAL: ICD-10-PCS | Mod: 26,,, | Performed by: RADIOLOGY

## 2023-08-27 PROCEDURE — 83690 ASSAY OF LIPASE: CPT | Performed by: EMERGENCY MEDICINE

## 2023-08-27 RX ORDER — KETOROLAC TROMETHAMINE 10 MG/1
10 TABLET, FILM COATED ORAL EVERY 6 HOURS PRN
Qty: 20 TABLET | Refills: 0 | Status: SHIPPED | OUTPATIENT
Start: 2023-08-27 | End: 2023-09-01

## 2023-08-27 RX ORDER — HYDROCHLOROTHIAZIDE 12.5 MG/1
12.5 TABLET ORAL DAILY
Qty: 30 TABLET | Refills: 0 | Status: SHIPPED | OUTPATIENT
Start: 2023-08-27 | End: 2023-09-26

## 2023-08-27 RX ORDER — HYDROCHLOROTHIAZIDE 12.5 MG/1
12.5 TABLET ORAL DAILY
Qty: 30 TABLET | Refills: 0 | Status: SHIPPED | OUTPATIENT
Start: 2023-08-27 | End: 2023-08-27 | Stop reason: SDUPTHER

## 2023-08-27 RX ORDER — KETOROLAC TROMETHAMINE 30 MG/ML
30 INJECTION, SOLUTION INTRAMUSCULAR; INTRAVENOUS
Status: COMPLETED | OUTPATIENT
Start: 2023-08-27 | End: 2023-08-27

## 2023-08-27 RX ORDER — KETOROLAC TROMETHAMINE 10 MG/1
10 TABLET, FILM COATED ORAL EVERY 6 HOURS PRN
Qty: 20 TABLET | Refills: 0 | Status: SHIPPED | OUTPATIENT
Start: 2023-08-27 | End: 2023-08-27 | Stop reason: SDUPTHER

## 2023-08-27 RX ADMIN — KETOROLAC TROMETHAMINE 30 MG: 30 INJECTION, SOLUTION INTRAMUSCULAR; INTRAVENOUS at 02:08

## 2023-08-27 NOTE — ED NOTES
"Initial assessment complete. Pt presents with intermittent chest "pressure" and SOB onset Thursday. Reports associated (persistent) nausea as well. No exacerbating or alleviating factors. Tobacco use. Approx 1 PPD. Denies cough. Reports bilateral leg swelling with worsening on the right.   "

## 2023-08-27 NOTE — ED PROVIDER NOTES
Encounter Date: 8/27/2023       History     Chief Complaint   Patient presents with    Chest Pain     Patient reports intermittent chest pain since Thursday. Patient states today she has had nausea and dizziness as well.      Patient is a 42-year-old female with complaints of sternal chest pain that radiates to her back along with mild shortness of breath and bilateral leg edema that has been present for the last 4 days.  Symptoms are random and nonexertional.  No fever or chills.  No cough.  Symptoms not exacerbated with eating or drinking.  Patient describes chest pain as a mild pressure and her back pain as more of an ache.    The history is provided by the patient and medical records.     Review of patient's allergies indicates:  No Known Allergies  No past medical history on file.  Past Surgical History:   Procedure Laterality Date    BREAST BIOPSY Right 08/14/2019    US biopsy. Benign    BREAST CYST ASPIRATION      8/14/2019     Family History   Problem Relation Age of Onset    Breast cancer Paternal Aunt     Breast cancer Paternal Grandmother     Breast cancer Paternal Aunt     Breast cancer Paternal Aunt     Breast cancer Paternal Aunt     Breast cancer Paternal Aunt      Social History     Tobacco Use    Smoking status: Every Day    Tobacco comments:     Tobacco cessation counseling given   Substance Use Topics    Alcohol use: Never    Drug use: Never     Review of Systems   Constitutional:  Negative for fever.   HENT:  Negative for sore throat.    Respiratory:  Positive for shortness of breath.    Cardiovascular:  Positive for chest pain.   Gastrointestinal:  Negative for nausea.   Genitourinary:  Negative for dysuria.   Musculoskeletal:  Positive for back pain.   Skin:  Negative for rash.   Neurological:  Negative for weakness.   Hematological:  Does not bruise/bleed easily.   All other systems reviewed and are negative.      Physical Exam     Initial Vitals [08/27/23 1346]   BP Pulse Resp Temp SpO2   (!)  148/87 94 19 99 °F (37.2 °C) 99 %      MAP       --         Physical Exam    Nursing note and vitals reviewed.  Constitutional: She appears well-developed and well-nourished.   HENT:   Head: Normocephalic and atraumatic.   Eyes: EOM are normal.   Neck: Neck supple.   Cardiovascular:            Tachycardic.   Pulmonary/Chest: No respiratory distress.   Abdominal: Abdomen is soft.   Musculoskeletal:         General: Edema present.      Cervical back: Neck supple.      Comments: 1+ bilateral leg edema.  No focal calf tenderness or swelling.     Neurological: She is alert and oriented to person, place, and time.   Skin: Skin is warm and dry.   Psychiatric: She has a normal mood and affect.         ED Course   Procedures  Labs Reviewed   CBC W/ AUTO DIFFERENTIAL - Abnormal; Notable for the following components:       Result Value    Hemoglobin 9.7 (*)     Hematocrit 32.6 (*)     MCV 71 (*)     MCH 21.0 (*)     MCHC 29.8 (*)     RDW 18.5 (*)     MPV 9.0 (*)     Immature Grans (Abs) 0.05 (*)     All other components within normal limits   COMPREHENSIVE METABOLIC PANEL - Abnormal; Notable for the following components:    CO2 22 (*)     All other components within normal limits   D DIMER, QUANTITATIVE   TROPONIN I   PREGNANCY TEST, URINE RAPID    Narrative:     Specimen Source->Urine   LIPASE     EKG Readings: (Independently Interpreted)   14:09 EKG #1 sinus tachycardia at 101 beats per minute, normal CO, normal QT, RSR', no william ST elevation or ectopy.       Imaging Results              X-Ray Chest PA And Lateral (Final result)  Result time 08/27/23 15:35:03      Final result by Sumeet Huff MD (08/27/23 15:35:03)                   Impression:      Right cardiophrenic angle opacity for which an underlying mass is possible.  Pericardial cysts are common in this location.  Further evaluation with chest CT is recommended.      Electronically signed by: Sumeet Huff MD  Date:    08/27/2023  Time:    15:35                Narrative:    EXAMINATION:  XR CHEST PA AND LATERAL    CLINICAL HISTORY:  Chest pain, unspecified    TECHNIQUE:  PA and lateral views of the chest were performed.    COMPARISON:  None    FINDINGS:  The heart size is normal.  There is opacification in the right cardiophrenic angle measuring approximately 4 cm.  Lungs are otherwise well expanded and clear.  No pleural effusion.                                       Medications   ketorolac injection 30 mg (30 mg Intravenous Given 8/27/23 1429)     Medical Decision Making  Differential diagnosis: Pulmonary embolus, acute bronchitis, pleurisy.  Patient is a 42-year-old female with chest and back pain, leg edema.  Patient has resting tachycardia.  We will send labs, image.  Expand workup is needed.    Amount and/or Complexity of Data Reviewed  Labs: ordered.  Radiology: ordered.    Risk  Prescription drug management.               ED Course as of 08/27/23 1547   Sun Aug 27, 2023   1544 Stable.  Labs encouraging.  Will discharge home. [RJ]      ED Course User Index  [RJ] Sumeet Lopez MD                    Clinical Impression:   Final diagnoses:  [R07.9] Chest pain  [R07.89] Atypical chest pain (Primary)  [M25.471, M25.472] Ankle edema, bilateral        ED Disposition Condition    Discharge Stable          ED Prescriptions       Medication Sig Dispense Start Date End Date Auth. Provider    ketorolac (TORADOL) 10 mg tablet Take 1 tablet (10 mg total) by mouth every 6 (six) hours as needed for Pain. 20 tablet 8/27/2023 9/1/2023 Sumeet Lopez MD    hydroCHLOROthiazide (HYDRODIURIL) 12.5 MG Tab Take 1 tablet (12.5 mg total) by mouth once daily. 30 tablet 8/27/2023 9/26/2023 Sumeet Lopez MD          Follow-up Information       Follow up With Specialties Details Why Contact Info    [facility] Emergency Medicine  As needed [address] [address] [phone]    Your primary care physician  Schedule an  appointment as soon as possible for a visit                Sumeet Lopez MD  08/27/23 5862

## 2023-08-27 NOTE — ED NOTES
DC and follow up instructions reviewed with patient. Verb understanding. Medications reviewed. Pt escorted to registration.

## 2024-11-08 ENCOUNTER — HOSPITAL ENCOUNTER (OUTPATIENT)
Dept: RADIOLOGY | Facility: HOSPITAL | Age: 44
Discharge: HOME | End: 2024-11-08
Payer: OTHER GOVERNMENT

## 2024-11-08 DIAGNOSIS — Z12.31 ENCOUNTER FOR SCREENING MAMMOGRAM FOR BREAST CANCER: ICD-10-CM

## 2024-11-18 ENCOUNTER — HOSPITAL ENCOUNTER (OUTPATIENT)
Dept: RADIOLOGY | Facility: HOSPITAL | Age: 44
Discharge: HOME | End: 2024-11-18
Attending: NURSE PRACTITIONER
Payer: OTHER GOVERNMENT

## 2024-11-18 ENCOUNTER — OFFICE VISIT (OUTPATIENT)
Dept: SURGERY | Facility: CLINIC | Age: 44
End: 2024-11-18
Payer: OTHER GOVERNMENT

## 2024-11-18 VITALS
DIASTOLIC BLOOD PRESSURE: 84 MMHG | WEIGHT: 195 LBS | HEART RATE: 88 BPM | HEIGHT: 63 IN | BODY MASS INDEX: 34.55 KG/M2 | SYSTOLIC BLOOD PRESSURE: 129 MMHG

## 2024-11-18 DIAGNOSIS — Z12.39 ENCOUNTER FOR SCREENING BREAST EXAMINATION: ICD-10-CM

## 2024-11-18 DIAGNOSIS — N64.52 NIPPLE DISCHARGE: Primary | ICD-10-CM

## 2024-11-18 DIAGNOSIS — N64.52 NIPPLE DISCHARGE: ICD-10-CM

## 2024-11-18 DIAGNOSIS — N64.4 BREAST PAIN: ICD-10-CM

## 2024-11-18 DIAGNOSIS — Z91.89 AT HIGH RISK FOR BREAST CANCER: ICD-10-CM

## 2024-11-18 PROCEDURE — 77066 DX MAMMO INCL CAD BI: CPT | Mod: TC

## 2024-11-18 PROCEDURE — 76642 ULTRASOUND BREAST LIMITED: CPT | Mod: 26,50,, | Performed by: RADIOLOGY

## 2024-11-18 PROCEDURE — 99203 OFFICE O/P NEW LOW 30 MIN: CPT | Mod: S$PBB,,, | Performed by: NURSE PRACTITIONER

## 2024-11-18 PROCEDURE — 99213 OFFICE O/P EST LOW 20 MIN: CPT | Mod: PBBFAC | Performed by: NURSE PRACTITIONER

## 2024-11-18 PROCEDURE — 76642 ULTRASOUND BREAST LIMITED: CPT | Mod: TC,50

## 2024-11-18 PROCEDURE — 77066 DX MAMMO INCL CAD BI: CPT | Mod: 26,,, | Performed by: RADIOLOGY

## 2024-11-18 PROCEDURE — 77062 BREAST TOMOSYNTHESIS BI: CPT | Mod: 26,,, | Performed by: RADIOLOGY

## 2024-11-18 PROCEDURE — 99999 PR PBB SHADOW E&M-EST. PATIENT-LVL III: CPT | Mod: PBBFAC,,, | Performed by: NURSE PRACTITIONER

## 2024-11-18 NOTE — PROGRESS NOTES
"[facility]  Department of Surgery      REFERRING PROVIDER: Self, Susana  No address on file    Chief Complaint: New Patient and Abnormal Mammogram      Subjective:      Patient ID: Aurelia Villeda is a 44 y.o. female who presents with right breast concerns. Reports noticing a "sore" on her nipple a few weeks ago. Also reports associated itching. Since then it has resolved. When she presented for her screening mammogram they refused given new symptoms and requested orders for a diagnostic mammogram. She is a previous high risk patient seen here in the breast center. Last imaging was in .     Patient does routinely do self breast exams.  Patient has noted a change on breast exam.  Patient admits nipple discharge, reports Left is spontaneous and non-bloody. Patient admits to to previous breast biopsy. Patient denies a personal history of breast cancer.    GYN History:  Age of menarche was 10.   Premenopausal   Patient denies hormonal therapy.   Patient is .   Age of first live birth was 26.   Patient did not breast feed.    Genetic testing in 2019 which was negative     Family History updated       Past Medical History:   Diagnosis Date    BRCA1 negative     BRCA2 negative      Past Surgical History:   Procedure Laterality Date    BREAST BIOPSY Right 2019    US biopsy. Benign    BREAST CYST ASPIRATION      2019     Current Outpatient Medications on File Prior to Visit   Medication Sig Dispense Refill    hydroCHLOROthiazide (HYDRODIURIL) 12.5 MG Tab Take 1 tablet (12.5 mg total) by mouth once daily. 30 tablet 0     No current facility-administered medications on file prior to visit.     Social History     Socioeconomic History    Marital status:    Tobacco Use    Smoking status: Every Day    Tobacco comments:     Tobacco cessation counseling given   Substance and Sexual Activity    Alcohol use: Never    Drug use: Never    Sexual activity: Yes     Birth control/protection: None " "    Family History   Problem Relation Name Age of Onset    Cancer Mother Cinthia Cruz        Melanoma    Ovarian cancer Mother Cinthia 20    Cancer Maternal Grandmother          Abebe Cancer    Cancer Maternal Grandfather          Melanoma    Breast cancer Paternal Grandmother Chaya     Breast cancer Paternal Aunt Demetrice     Breast cancer Paternal Aunt M     Breast cancer Paternal Aunt S     Breast cancer Paternal Aunt A     Breast cancer Paternal Aunt SG     Cancer Maternal Aunt          Review of Systems   Constitutional:  Negative for chills, fatigue, fever and unexpected weight change.   Eyes:  Negative for visual disturbance.   Respiratory:  Negative for cough, shortness of breath and wheezing.    Cardiovascular:  Negative for chest pain and palpitations.   Skin:  Negative for color change, pallor, rash and wound.   Neurological:  Negative for dizziness and headaches.     Objective:   /84   Pulse 88   Ht 5' 3" (1.6 m)   Wt 88.5 kg (195 lb)   LMP 10/01/2024 (Approximate)   BMI 34.54 kg/m²     Physical Exam   Vitals reviewed.  Constitutional: She is oriented to person, place, and time.   Eyes: Right eye exhibits no discharge. Left eye exhibits no discharge.   Pulmonary/Chest: Effort normal. No respiratory distress. She has no wheezes. Right breast exhibits no inverted nipple, no mass, no nipple discharge, no skin change and no tenderness. Left breast exhibits nipple discharge. Left breast exhibits no inverted nipple, no mass, no skin change and no tenderness.       Abdominal: Normal appearance.   Musculoskeletal: Normal range of motion. Lymphadenopathy:      Cervical: No cervical adenopathy.     Neurological: She is alert and oriented to person, place, and time.   Skin: Skin is warm and dry. No rash noted. No erythema. No pallor.         Radiology review: Images personally reviewed by me in the clinic.       Assessment:       1. Nipple discharge    2. Encounter for screening breast examination    3. At high " risk for breast cancer        Plan:     We discussed exam findings. Will proceed with diagnostic mammogram +/- Left US  Given spontaneous left nipple discharge will order breast MRI if imaging is negative today. Patient reports nausea with administration of the contrast so will order Zofran    Will get her back in the high risk clinic.     The patient is in agreement with the plan. Questions were encouraged and answered to patient's satisfaction. Aurelia will call our office with any questions or concerns.

## 2024-11-19 ENCOUNTER — TELEPHONE (OUTPATIENT)
Dept: RADIOLOGY | Facility: HOSPITAL | Age: 44
End: 2024-11-19
Payer: OTHER GOVERNMENT

## 2024-11-22 ENCOUNTER — TELEPHONE (OUTPATIENT)
Dept: RADIOLOGY | Facility: HOSPITAL | Age: 44
End: 2024-11-22
Payer: OTHER GOVERNMENT

## 2024-12-20 ENCOUNTER — HOSPITAL ENCOUNTER (OUTPATIENT)
Dept: RADIOLOGY | Facility: HOSPITAL | Age: 44
Discharge: HOME | End: 2024-12-20
Attending: NURSE PRACTITIONER
Payer: OTHER GOVERNMENT

## 2024-12-20 DIAGNOSIS — N64.52 NIPPLE DISCHARGE: ICD-10-CM

## 2024-12-20 DIAGNOSIS — N64.4 BREAST PAIN: ICD-10-CM

## 2024-12-20 PROCEDURE — A9577 INJ MULTIHANCE: HCPCS | Performed by: NURSE PRACTITIONER

## 2024-12-20 PROCEDURE — 77049 MRI BREAST C-+ W/CAD BI: CPT | Mod: 26,,, | Performed by: RADIOLOGY

## 2024-12-20 PROCEDURE — 77049 MRI BREAST C-+ W/CAD BI: CPT | Mod: TC

## 2024-12-20 PROCEDURE — 25500020 PHARM REV CODE 255: Performed by: NURSE PRACTITIONER

## 2024-12-20 RX ADMIN — GADOBENATE DIMEGLUMINE 18 ML: 529 INJECTION, SOLUTION INTRAVENOUS at 04:12

## 2024-12-23 ENCOUNTER — PATIENT MESSAGE (OUTPATIENT)
Dept: SURGERY | Facility: CLINIC | Age: 44
End: 2024-12-23
Payer: OTHER GOVERNMENT

## 2024-12-24 ENCOUNTER — TELEPHONE (OUTPATIENT)
Dept: SURGERY | Facility: CLINIC | Age: 44
End: 2024-12-24
Payer: OTHER GOVERNMENT

## 2024-12-26 ENCOUNTER — TELEPHONE (OUTPATIENT)
Dept: SURGERY | Facility: CLINIC | Age: 44
End: 2024-12-26
Payer: OTHER GOVERNMENT

## 2024-12-26 NOTE — TELEPHONE ENCOUNTER
Called patient with MRI results. Patient reports continued itching. Has taken Claritin but not benadryl. She denies any facial swelling. I recommended she start Benadryl. She will f/u tomorrow.     Will need 3 month f/u with me

## 2025-03-03 ENCOUNTER — OFFICE VISIT (OUTPATIENT)
Dept: SURGERY | Facility: CLINIC | Age: 45
End: 2025-03-03
Payer: OTHER GOVERNMENT

## 2025-03-03 VITALS
DIASTOLIC BLOOD PRESSURE: 85 MMHG | WEIGHT: 176 LBS | SYSTOLIC BLOOD PRESSURE: 125 MMHG | OXYGEN SATURATION: 99 % | HEIGHT: 63 IN | HEART RATE: 73 BPM | BODY MASS INDEX: 31.18 KG/M2

## 2025-03-03 DIAGNOSIS — N64.52 NIPPLE DISCHARGE: Primary | ICD-10-CM

## 2025-03-03 DIAGNOSIS — Z09 FOLLOW-UP EXAM: ICD-10-CM

## 2025-03-03 PROCEDURE — 99213 OFFICE O/P EST LOW 20 MIN: CPT | Mod: S$PBB,,, | Performed by: NURSE PRACTITIONER

## 2025-03-03 PROCEDURE — 99999 PR PBB SHADOW E&M-EST. PATIENT-LVL III: CPT | Mod: PBBFAC,,, | Performed by: NURSE PRACTITIONER

## 2025-03-03 PROCEDURE — 99213 OFFICE O/P EST LOW 20 MIN: CPT | Mod: PBBFAC | Performed by: NURSE PRACTITIONER

## 2025-03-03 RX ORDER — ERGOCALCIFEROL 1.25 MG/1
50000 CAPSULE ORAL
COMMUNITY
Start: 2024-12-02

## 2025-03-03 RX ORDER — BUPROPION HYDROCHLORIDE 150 MG/1
150 TABLET, FILM COATED ORAL 2 TIMES DAILY
COMMUNITY
Start: 2025-02-25

## 2025-03-03 NOTE — PROGRESS NOTES
"[facility]  Department of Surgery      REFERRING PROVIDER: No referring provider defined for this encounter.    Chief Complaint: Breast Pain (R breast pain and L nipple DC)      Subjective:      Patient ID: Aurelia Villeda is a 44 y.o. female who presents with right breast concerns. Reports noticing a "sore" on her nipple a few weeks ago. Also reports associated itching. Since then it has resolved. When she presented for her screening mammogram they refused given new symptoms and requested orders for a diagnostic mammogram. She is a previous high risk patient seen here in the breast center. Last imaging was in .     Patient does routinely do self breast exams.  Patient has noted a change on breast exam.  Patient admits nipple discharge, reports Left is spontaneous and non-bloody. Patient admits to to previous breast biopsy. Patient denies a personal history of breast cancer.    GYN History:  Age of menarche was 10.   Premenopausal   Patient denies hormonal therapy.   Patient is .   Age of first live birth was 26.   Patient did not breast feed.    Genetic testing in 2019 which was negative     Family History updated     Interval History:   She presents for follow up. Reports continued intermittent spontaneous left nipple drainage. Denies episodes of bloody drainage. Reports it is less than the size of a dime.     She reports a few hours following MRI -  itching throughout whole body for 1 week. No hives. Tried benadryl and Claritin with some relief after a few days. Denies vomiting, nausea or swelling. Did take a Zofran.           Past Medical History:   Diagnosis Date    BRCA1 negative     BRCA2 negative      Past Surgical History:   Procedure Laterality Date    BREAST BIOPSY Right 2019    US biopsy. Benign    BREAST CYST ASPIRATION      2019     Current Outpatient Medications on File Prior to Visit   Medication Sig Dispense Refill    ergocalciferol (ERGOCALCIFEROL) 50,000 unit Cap Take " "50,000 Units by mouth every 7 days.      WELLBUTRIN  mg TBSR 12 hr tablet Take 150 mg by mouth 2 (two) times daily.      hydroCHLOROthiazide (HYDRODIURIL) 12.5 MG Tab Take 1 tablet (12.5 mg total) by mouth once daily. (Patient not taking: Reported on 3/3/2025) 30 tablet 0     No current facility-administered medications on file prior to visit.     Social History     Socioeconomic History    Marital status:    Tobacco Use    Smoking status: Every Day    Tobacco comments:     Tobacco cessation counseling given   Substance and Sexual Activity    Alcohol use: Never    Drug use: Never    Sexual activity: Yes     Birth control/protection: None     Family History   Problem Relation Name Age of Onset    Cancer Mother Cinthia 20        Melanoma    Ovarian cancer Mother Cinthia 20    Cancer Maternal Grandmother          Abebe Cancer    Cancer Maternal Grandfather          Melanoma    Breast cancer Paternal Grandmother Chaya     Breast cancer Paternal Aunt Demetrice     Breast cancer Paternal Aunt M     Breast cancer Paternal Aunt S     Breast cancer Paternal Aunt A     Breast cancer Paternal Aunt SG     Cancer Maternal Aunt          Review of Systems   Constitutional:  Negative for chills, fatigue, fever and unexpected weight change.   Eyes:  Negative for visual disturbance.   Respiratory:  Negative for cough, shortness of breath and wheezing.    Cardiovascular:  Negative for chest pain and palpitations.   Skin:  Negative for color change, pallor, rash and wound.   Neurological:  Negative for dizziness and headaches.     Objective:   /85 (BP Location: Left arm, Patient Position: Sitting)   Pulse 73   Ht 5' 3" (1.6 m)   Wt 79.8 kg (176 lb)   LMP 02/17/2025   SpO2 99%   BMI 31.18 kg/m²     Physical Exam   Vitals reviewed.  Constitutional: She is oriented to person, place, and time.   Eyes: Right eye exhibits no discharge. Left eye exhibits no discharge.   Pulmonary/Chest: Effort normal. No respiratory " distress. She has no wheezes. Right breast exhibits no inverted nipple, no mass, no nipple discharge, no skin change and no tenderness. Left breast exhibits nipple discharge. Left breast exhibits no inverted nipple, no mass, no skin change and no tenderness.       Abdominal: Normal appearance.   Musculoskeletal: Normal range of motion. Lymphadenopathy:      Cervical: No cervical adenopathy.     Neurological: She is alert and oriented to person, place, and time.   Skin: Skin is warm and dry. No rash noted. No erythema. No pallor.         Radiology review: Images personally reviewed by me in the clinic.       Assessment:       1. Nipple discharge    2. Follow-up exam          Plan:     We discussed exam findings.   Will have her meet with a breast surgeon given persistent spontaneous nipple discharge and negative imaging work up.   I will see her back with screening mammogram in November 2025    The patient is in agreement with the plan. Questions were encouraged and answered to patient's satisfaction. Aurelia will call our office with any questions or concerns.

## 2025-03-18 ENCOUNTER — OFFICE VISIT (OUTPATIENT)
Dept: SURGERY | Facility: CLINIC | Age: 45
End: 2025-03-18
Payer: OTHER GOVERNMENT

## 2025-03-18 VITALS
WEIGHT: 176 LBS | DIASTOLIC BLOOD PRESSURE: 85 MMHG | OXYGEN SATURATION: 99 % | HEIGHT: 63 IN | HEART RATE: 77 BPM | SYSTOLIC BLOOD PRESSURE: 125 MMHG | BODY MASS INDEX: 31.18 KG/M2

## 2025-03-18 DIAGNOSIS — N64.52 NIPPLE DISCHARGE: Primary | ICD-10-CM

## 2025-03-18 PROCEDURE — 99213 OFFICE O/P EST LOW 20 MIN: CPT | Mod: S$GLB,,, | Performed by: SURGERY

## 2025-03-18 NOTE — PROGRESS NOTES
"[facility]  Department of Surgery      REFERRING PROVIDER: No referring provider defined for this encounter.    Chief Complaint: Breast Discharge (Nipple Discharge  Left Breast .)      Subjective:      Patient ID: Aurelia Villeda is a 44 y.o. female who presents with right breast concerns. Reports noticing a "sore" on her nipple a few weeks ago. Also reports associated itching. Since then it has resolved. When she presented for her screening mammogram they refused given new symptoms and requested orders for a diagnostic mammogram. She is a previous high risk patient seen here in the breast center. Last imaging was in .     Patient does routinely do self breast exams.  Patient has noted a change on breast exam.  Patient admits nipple discharge, reports Left is spontaneous and non-bloody. Patient admits to to previous breast biopsy. Patient denies a personal history of breast cancer.    GYN History:  Age of menarche was 10.   Premenopausal   Patient denies hormonal therapy.   Patient is .   Age of first live birth was 26.   Patient did not breast feed.    Genetic testing in 2019 which was negative     Family History updated     Interval History:   She presents for follow up. Reports continued intermittent spontaneous left nipple drainage. Denies episodes of bloody drainage. Reports it is less than the size of a dime. Endorses resolution of skin lesion and itchiness in right breast.  Recent breast MRI with no concerning findings.    Past Medical History:   Diagnosis Date    BRCA1 negative     BRCA2 negative      Past Surgical History:   Procedure Laterality Date    BREAST BIOPSY Right 2019     biopsy. Benign    BREAST CYST ASPIRATION      2019     Current Outpatient Medications on File Prior to Visit   Medication Sig Dispense Refill    ergocalciferol (ERGOCALCIFEROL) 50,000 unit Cap Take 50,000 Units by mouth every 7 days.      WELLBUTRIN  mg TBSR 12 hr tablet Take 150 mg by mouth 2 " "(two) times daily.      hydroCHLOROthiazide (HYDRODIURIL) 12.5 MG Tab Take 1 tablet (12.5 mg total) by mouth once daily. 30 tablet 0     No current facility-administered medications on file prior to visit.     Social History     Socioeconomic History    Marital status:    Tobacco Use    Smoking status: Every Day    Tobacco comments:     Tobacco cessation counseling given   Substance and Sexual Activity    Alcohol use: Never    Drug use: Never    Sexual activity: Yes     Birth control/protection: None     Family History   Problem Relation Name Age of Onset    Cancer Mother iCnthia Cruz        Melanoma    Ovarian cancer Mother Cinthia 20    Cancer Maternal Grandmother          Abebe Cancer    Cancer Maternal Grandfather          Melanoma    Breast cancer Paternal Grandmother Chaya     Breast cancer Paternal Aunt Demetrice     Breast cancer Paternal Aunt M     Breast cancer Paternal Aunt S     Breast cancer Paternal Aunt A     Breast cancer Paternal Aunt SG     Cancer Maternal Aunt          Review of Systems   Constitutional:  Negative for chills, fatigue, fever and unexpected weight change.   Eyes:  Negative for visual disturbance.   Respiratory:  Negative for cough, shortness of breath and wheezing.    Cardiovascular:  Negative for chest pain and palpitations.   Skin:  Negative for color change, pallor, rash and wound.   Neurological:  Negative for dizziness and headaches.     Objective:   /85 (BP Location: Left arm, Patient Position: Sitting)   Pulse 77   Ht 5' 3" (1.6 m)   Wt 79.8 kg (176 lb)   LMP 02/17/2025   SpO2 99%   BMI 31.18 kg/m²     Physical Exam   Vitals reviewed.  Constitutional: She is oriented to person, place, and time.   Eyes: Right eye exhibits no discharge. Left eye exhibits no discharge.   Pulmonary/Chest: Effort normal. No respiratory distress. She has no wheezes. Right breast exhibits no inverted nipple, no mass, no nipple discharge, no skin change and no tenderness. Left breast " exhibits nipple discharge. Left breast exhibits no inverted nipple, no mass, no skin change and no tenderness.       Abdominal: Normal appearance.   Musculoskeletal: Normal range of motion. Lymphadenopathy:      Cervical: No cervical adenopathy.     Neurological: She is alert and oriented to person, place, and time.   Skin: Skin is warm and dry. No rash noted. No erythema. No pallor.       Radiology review: Images personally reviewed by me in the clinic.     MRI  Findings:  Heterogeneous fibroglandular tissue.  Moderate background parenchymal enhancement.     No MRI correlate for the patient's symptoms (normal tissue).  No suspicious finding in either breast.  No significant change since prior MRI May 24, 2021.     No abnormal skin or nipple enhancement.     No adenopathy.     Impression:  No suspicious finding in either breast.  No MRI correlate for spontaneous nipple discharge.     BI-RADS Category:   Overall: 1 - Negative     Recommendation:  Annual screening mammogram schedule, next due November 2025.  Screening bilateral breast MRI with IV contrast, next due in 1 year  Assessment:       1. 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  Traditionally the lesions have been surgically excised.  Despite recent research efforts to identify a low risk group, surgical excision remains the recommendation for spontaneous single duct discharge.  We discussed the option for surgery. This would be a left breast single duct excision vs. major duct excision.  If we could identify the single duct reliably at the time of surgery I could proceed with a targeted single duct excision.  If the single duct could not be reliably identified on the day of surgery I would have to proceed with a major duct  excision.  Major duct excision involves removal of larger amount of tissue behind the nipple including the terminal end of the major ducts. Risks and benefits were discussed. Risks include but are not limited to bleeding, infection, need for additional surgery, and poor cosmesis. There are additional risks associated with anesthesia given for the procedure.  Co-mordities include smoking. Based on her medical history she is a moderate risk of complications. Materials utilized in the surgery include surgical metal clips, suture material, and skin adhesives. These materials can lead to allergic reactions, skin irration, and other complications. Medications utilized in surgery include but are not limited to antibiotics, isosulfan blue dye, and technetium sulfa colloid.  Given the patient's allergy history there is no expected allergic reaction.     Aurelia will think about surgery and call clinic if she would like to proceed.     The patient is in agreement with the plan. Questions were encouraged and answered to patient's satisfaction. Aurelia will call our office with any questions or concerns.